# Patient Record
Sex: FEMALE | Race: WHITE | NOT HISPANIC OR LATINO
[De-identification: names, ages, dates, MRNs, and addresses within clinical notes are randomized per-mention and may not be internally consistent; named-entity substitution may affect disease eponyms.]

---

## 2020-10-30 ENCOUNTER — APPOINTMENT (OUTPATIENT)
Dept: COLORECTAL SURGERY | Facility: CLINIC | Age: 65
End: 2020-10-30
Payer: COMMERCIAL

## 2020-10-30 VITALS
BODY MASS INDEX: 23.19 KG/M2 | RESPIRATION RATE: 14 BRPM | HEIGHT: 62 IN | SYSTOLIC BLOOD PRESSURE: 126 MMHG | HEART RATE: 75 BPM | TEMPERATURE: 98 F | WEIGHT: 126 LBS | DIASTOLIC BLOOD PRESSURE: 75 MMHG

## 2020-10-30 DIAGNOSIS — Z87.39 PERSONAL HISTORY OF OTHER DISEASES OF THE MUSCULOSKELETAL SYSTEM AND CONNECTIVE TISSUE: ICD-10-CM

## 2020-10-30 DIAGNOSIS — Z86.79 PERSONAL HISTORY OF OTHER DISEASES OF THE CIRCULATORY SYSTEM: ICD-10-CM

## 2020-10-30 DIAGNOSIS — M34.1 CR(E)ST SYNDROME: ICD-10-CM

## 2020-10-30 PROCEDURE — 46600 DIAGNOSTIC ANOSCOPY SPX: CPT

## 2020-10-30 PROCEDURE — 99072 ADDL SUPL MATRL&STAF TM PHE: CPT

## 2020-10-30 PROCEDURE — 99243 OFF/OP CNSLTJ NEW/EST LOW 30: CPT | Mod: 25

## 2020-10-30 RX ORDER — HYDROXYCHLOROQUINE SULFATE 200 MG/1
TABLET ORAL
Refills: 0 | Status: ACTIVE | COMMUNITY

## 2020-10-30 RX ORDER — OMEPRAZOLE 20 MG/1
TABLET, DELAYED RELEASE ORAL
Refills: 0 | Status: ACTIVE | COMMUNITY

## 2020-10-30 NOTE — PHYSICAL EXAM
[Normal rectal exam] : exam was normal [Reduce Spontaneously] : a spontaneously reducible (grade II) [Lax] : was lax [None] : there was no rectal mass  [No Rash or Lesion] : No rash or lesion [Alert] : alert [Oriented to Person] : oriented to person [Oriented to Place] : oriented to place [Oriented to Time] : oriented to time [Calm] : calm [Skin Tags] : there were no residual hemorrhoidal skin tags seen [Gross Blood] : no gross blood [de-identified] : No apparent distress [de-identified] : Normocephalic atraumatic [de-identified] : Moving all extremities x4

## 2020-10-30 NOTE — HISTORY OF PRESENT ILLNESS
[FreeTextEntry1] : Ms. Vasquez presents to the office for consultation regarding a chronic history of fecal incontinence.  She reports a history of loose stools with daily passage of stools at least 3-4 times a day.  To help manage this, she uses Imodium regularly.  She notes that the Imodium will help bind her stool, but despite this, she will still pass small particles of formed stool per rectum without realizing.  She has tried to bulk up her stool with Metamucil, but this has been ineffective as she winds up passing stools more frequently and also has excessive bloating and gas with usage.  She is unable to also maintain gas continence.  She states that fecal incontinent episodes occur at least 4-5 times a week.  She denies any anorectal surgery, trauma from vaginal delivery, radiation to the rectum and denies any diabetic neuropathy.  She reports being up-to-date with her colonoscopy with the most recent one having been completed at least within the last 5 years.

## 2020-10-30 NOTE — ASSESSMENT
[FreeTextEntry1] : Ms. Vasquez presents to the office with reports of chronic fecal incontinence.  In office today, digital rectal exam confirmed a somewhat weak anal sphincter despite voluntary squeeze.  As she denies any trauma from surgery or delivery to the area, I do not suspect this muscle weakness is secondary to an anatomical sphincter defect.  In addition, she denies diabetic neuropathy and denies radiation to the area which could lead also to functional issues with continence.  Likely, she has age-related weakness of her external sphincter muscle.  Towards this end, I have recommended conservative management first with biofeedback therapy and dedicated pelvic floor physical therapy.  Should this fail to improve her condition, we can further investigate with anal manometry before consideration for sacral nerve stimulator.  I addressed the option of SNS, and she is interested, though agrees to proceed with conservative management first.  Follow-up in 6 weeks.

## 2020-10-30 NOTE — CONSULT LETTER
[Dear  ___] : Dear  [unfilled], [Consult Letter:] : I had the pleasure of evaluating your patient, [unfilled]. [Please see my note below.] : Please see my note below. [Consult Closing:] : Thank you very much for allowing me to participate in the care of this patient.  If you have any questions, please do not hesitate to contact me. [Sincerely,] : Sincerely, [FreeTextEntry3] : Chasity Nugent MD\par  [DrRiky  ___] : Dr. HEWITT

## 2020-12-23 ENCOUNTER — APPOINTMENT (OUTPATIENT)
Dept: COLORECTAL SURGERY | Facility: CLINIC | Age: 65
End: 2020-12-23
Payer: COMMERCIAL

## 2020-12-23 VITALS
HEIGHT: 62 IN | TEMPERATURE: 97.6 F | SYSTOLIC BLOOD PRESSURE: 122 MMHG | WEIGHT: 125 LBS | DIASTOLIC BLOOD PRESSURE: 78 MMHG | RESPIRATION RATE: 14 BRPM | HEART RATE: 75 BPM | BODY MASS INDEX: 23 KG/M2

## 2020-12-23 PROCEDURE — 99072 ADDL SUPL MATRL&STAF TM PHE: CPT

## 2020-12-23 PROCEDURE — 99214 OFFICE O/P EST MOD 30 MIN: CPT

## 2020-12-23 NOTE — PHYSICAL EXAM
[Normal rectal exam] : exam was normal [Reduce Spontaneously] : a spontaneously reducible (grade II) [Skin Tags] : there were no residual hemorrhoidal skin tags seen [Lax] : was lax [None] : there was no rectal mass  [Gross Blood] : no gross blood [No Rash or Lesion] : No rash or lesion [Alert] : alert [Oriented to Person] : oriented to person [Oriented to Place] : oriented to place [Oriented to Time] : oriented to time [Calm] : calm [de-identified] : No apparent distress [de-identified] : Normocephalic atraumatic [de-identified] : Moving all extremities x4

## 2020-12-23 NOTE — ASSESSMENT
[FreeTextEntry1] : Ms. Vasquez presents to the office with reports of chronic fecal incontinence.  In office today, digital rectal exam confirmed a somewhat weak anal sphincter despite voluntary squeeze.  As she denies any trauma from surgery or delivery to the area, I do not suspect this muscle weakness is secondary to an anatomical sphincter defect.  In addition, she denies diabetic neuropathy and denies radiation to the area which could lead also to functional issues with continence.  Likely, she has age-related weakness of her external sphincter muscle.  Towards this end, I have recommended conservative management first with biofeedback therapy and dedicated pelvic floor physical therapy.  Should this fail to improve her condition, we can further investigate with anal manometry before consideration for sacral nerve stimulator.  I addressed the option of SNS, and she is interested, though agrees to proceed with conservative management first.  Follow-up in 6 weeks.\par \par 12/23/20 Ms. Vasquez presemts to the office for follow-up of her issues with fecal incontinence.  Pelvic floor physical therapy apparently has been unproductive and ineffective.  On review of PT reports, patient is not entirely agreeable with all the necessary treatments that were offered, thereby limiting effectiveness of therapy.  There is also some clinical suspicion of rectal prolapse versus internal intussusception.  Patient herself denies any rectal prolapse, but admits to pelvic pressure during attempts at defecation.  In order to further work-up her pelvic floor issues, we will proceed with MRI defecography.  The methods with which this study is conducted was reviewed and although she is reluctant, she understands that it is necessary to help formulate a plan of care.  We will notify her of next steps once results are available.

## 2020-12-23 NOTE — HISTORY OF PRESENT ILLNESS
[FreeTextEntry1] : Ms. Vasquez presents to the office for consultation regarding a chronic history of fecal incontinence.  She reports a history of loose stools with daily passage of stools at least 3-4 times a day.  To help manage this, she uses Imodium regularly.  She notes that the Imodium will help bind her stool, but despite this, she will still pass small particles of formed stool per rectum without realizing.  She has tried to bulk up her stool with Metamucil, but this has been ineffective as she winds up passing stools more frequently and also has excessive bloating and gas with usage.  She is unable to also maintain gas continence.  She states that fecal incontinent episodes occur at least 4-5 times a week.  She denies any anorectal surgery, trauma from vaginal delivery, radiation to the rectum and denies any diabetic neuropathy.  She reports being up-to-date with her colonoscopy with the most recent one having been completed at least within the last 5 years.\par \par 12/23/20 Ms. Vasquez presents to the office for followup. Since her last office visit, she has been seen and evaluated by pelvic floor PT twice with minimal to no improvement. Per pt, on discussion with PT, there is some concern that she has a component of rectal prolapse or internal intussusception that is contributing to her issues.

## 2021-09-29 ENCOUNTER — APPOINTMENT (OUTPATIENT)
Age: 66
End: 2021-09-29
Payer: COMMERCIAL

## 2021-09-29 VITALS
HEIGHT: 62 IN | WEIGHT: 127 LBS | DIASTOLIC BLOOD PRESSURE: 70 MMHG | BODY MASS INDEX: 23.37 KG/M2 | SYSTOLIC BLOOD PRESSURE: 130 MMHG

## 2021-09-29 DIAGNOSIS — Z78.9 OTHER SPECIFIED HEALTH STATUS: ICD-10-CM

## 2021-09-29 DIAGNOSIS — Z63.5 DISRUPTION OF FAMILY BY SEPARATION AND DIVORCE: ICD-10-CM

## 2021-09-29 DIAGNOSIS — Z83.3 FAMILY HISTORY OF DIABETES MELLITUS: ICD-10-CM

## 2021-09-29 DIAGNOSIS — Z87.891 PERSONAL HISTORY OF NICOTINE DEPENDENCE: ICD-10-CM

## 2021-09-29 DIAGNOSIS — Z80.3 FAMILY HISTORY OF MALIGNANT NEOPLASM OF BREAST: ICD-10-CM

## 2021-09-29 DIAGNOSIS — M62.89 OTHER SPECIFIED DISORDERS OF MUSCLE: ICD-10-CM

## 2021-09-29 DIAGNOSIS — Z87.19 PERSONAL HISTORY OF OTHER DISEASES OF THE DIGESTIVE SYSTEM: ICD-10-CM

## 2021-09-29 PROCEDURE — 81003 URINALYSIS AUTO W/O SCOPE: CPT | Mod: QW

## 2021-09-29 PROCEDURE — 51701 INSERT BLADDER CATHETER: CPT

## 2021-09-29 PROCEDURE — 99205 OFFICE O/P NEW HI 60 MIN: CPT | Mod: 25

## 2021-09-29 RX ORDER — ESTRADIOL 0.1 MG/D
PATCH, EXTENDED RELEASE TRANSDERMAL
Refills: 0 | Status: ACTIVE | COMMUNITY

## 2021-09-29 RX ORDER — TOFACITINIB 10 MG/1
TABLET, FILM COATED ORAL
Refills: 0 | Status: ACTIVE | COMMUNITY

## 2021-09-29 RX ORDER — METHOTREXATE 2.5 MG/1
2.5 TABLET ORAL
Refills: 0 | Status: ACTIVE | COMMUNITY
Start: 2021-09-29

## 2021-09-29 RX ORDER — LIFITEGRAST 50 MG/ML
SOLUTION/ DROPS OPHTHALMIC
Refills: 0 | Status: ACTIVE | COMMUNITY

## 2021-09-29 RX ORDER — CHROMIUM 200 MCG
TABLET ORAL
Refills: 0 | Status: ACTIVE | COMMUNITY

## 2021-09-29 SDOH — SOCIAL STABILITY - SOCIAL INSECURITY: DISRUPTION OF FAMILY BY SEPARATION AND DIVORCE: Z63.5

## 2021-09-29 NOTE — OB HISTORY
[Vaginal ___] : [unfilled] vaginal delivery(s) [Definite ___ (Date)] : the last menstrual period was [unfilled] [Last Pap Smear ___] : date of last pap smear was on [unfilled] [Abnormal Pap Smear] : abnormal pap smear [Sexually Active] : is not sexually active [FreeTextEntry1] : h/o HPV

## 2021-09-29 NOTE — PHYSICAL EXAM
[Chaperone Present] : A chaperone was present in the examining room during all aspects of the physical examination [No Acute Distress] : in no acute distress [Well developed] : well developed [Well Nourished] : ~L well nourished [Oriented x3] : oriented to person, place, and time [Normal Memory] : ~T memory was ~L unimpaired [Normal Mood/Affect] : mood and affect are normal [No Edema] : ~T edema was not present [None] : no CVA tenderness [Warm and Dry] : was warm and dry to touch [Normal Gait] : gait was normal [Vulvar Atrophy] : vulvar atrophy [Labia Majora] : were normal [Labia Minora] : were normal [Atrophy] : atrophy [No Bleeding] : there was no active vaginal bleeding [2] : 2 [Aa ____] : Aa [unfilled] [Ba ____] : Ba [unfilled] [C ____] : C [unfilled] [GH ____] : GH [unfilled] [PB ____] : PB [unfilled] [TVL ____] : TVL  [unfilled] [Ap ____] : Ap [unfilled] [Bp ____] : Bp [unfilled] [D ____] : D [unfilled] [Absent] : absent [Normal] : no abnormalities [Post Void Residual ____ml] : post void residual was [unfilled] ml [Normal rectal exam] : was normal [Cough] : no cough [Tenderness] : ~T no ~M abdominal tenderness observed [Distended] : not distended [Inguinal LAD] : no adenopathy was noted in the inguinal lymph nodes [FreeTextEntry3] : neg CST

## 2021-09-29 NOTE — DISCUSSION/SUMMARY
[FreeTextEntry1] : \marielle Coronel presents for eval of vault prolapse with significant defecatory complaints. Exam with rectocele and apical prolapse, normal PVR, negative CST. Visual illustrations were used to demonstrate prolapse. We reviewed management options for her prolapse including: observation, pelvic floor exercises with and without physical therapy, pessary, and surgical management. We reviewed the different types of surgical procedures including abdominal, vaginal, and robotic/laparoscopic procedures. Mesh and non-mesh options were reviewed. IUGA information on prolapse was given to her. She is considering surgery with robotic reconstruction. I discussed MRI def due to concern for rectal prolapse. Discussed UDS/cysto and then will return for discussion. Alll questions were answered. \par

## 2021-09-29 NOTE — PROCEDURE
[Straight Catheterization] : insertion of a straight catheter [Urinary Frequency] : urinary frequency [Patient] : the patient [___ Fr Straight Tip] : a [unfilled] in [place] straight tip catheter [None] : there were no complications with the catheter insertion [No Complications] : no complications [Tolerated Well] : the patient tolerated the procedure well [Post procedure instructions and information given] : Post procedure instructions and information were given and reviewed with patient. [0] : 0

## 2021-09-29 NOTE — HISTORY OF PRESENT ILLNESS
[Cystocele (Obstetric)] : no [Uterine Prolapse] : no [Vaginal Wall Prolapse] : no [Rectal Prolapse] : no [Unable To Restrain Bowel Movement] : mild [x1] : nocturia once nightly [Urinary Frequency] : no [Feelings Of Urinary Urgency] : no [Pain During Urination (Dysuria)] : no [Urinary Tract Infection] : moderate [Constipation Obstructed Defecation] : no [Stool Visible Blood] : moderate [Pelvic Pain] : no [Vaginal Pain] : no [] : no [FreeTextEntry1] : \par 66 y/o presents with posthysterectomy prolapse, she reports that the bulge is bothersome, and when she sneezes she feels like it is a hernia and a bulge, she feels that for 4-5 years she has felt pressure when she stands, she has seen CRS for FI, with some suspicion for rectal prolapse, reports JIL  sometimes has UUI,reports frequency/urgency, dneies dysuria, denies hematuria, reports intermittent stream, occasional UTIs, reports occasional groin pain, no bulge from that area, no history of hernia \par \par in 2005 had a vaginal  hysterectomy for abnormal pap and now feels prolapse vaginally

## 2021-09-30 LAB
APPEARANCE: CLEAR
BACTERIA: NEGATIVE
BILIRUBIN URINE: NEGATIVE
BLOOD URINE: NORMAL
COLOR: COLORLESS
GLUCOSE QUALITATIVE U: NEGATIVE
HYALINE CASTS: 0 /LPF
KETONES URINE: NEGATIVE
LEUKOCYTE ESTERASE URINE: NEGATIVE
MICROSCOPIC-UA: NORMAL
NITRITE URINE: NEGATIVE
PH URINE: 6
PROTEIN URINE: NEGATIVE
RED BLOOD CELLS URINE: 0 /HPF
SPECIFIC GRAVITY URINE: 1.01
SQUAMOUS EPITHELIAL CELLS: 0 /HPF
UROBILINOGEN URINE: NORMAL
WHITE BLOOD CELLS URINE: 0 /HPF

## 2021-10-01 LAB — BACTERIA UR CULT: NORMAL

## 2021-10-28 ENCOUNTER — APPOINTMENT (OUTPATIENT)
Age: 66
End: 2021-10-28
Payer: COMMERCIAL

## 2021-10-28 PROCEDURE — 51784 ANAL/URINARY MUSCLE STUDY: CPT

## 2021-10-28 PROCEDURE — 51729 CYSTOMETROGRAM W/VP&UP: CPT

## 2021-10-28 PROCEDURE — 51797 INTRAABDOMINAL PRESSURE TEST: CPT

## 2021-10-28 PROCEDURE — 51741 ELECTRO-UROFLOWMETRY FIRST: CPT

## 2021-11-01 ENCOUNTER — NON-APPOINTMENT (OUTPATIENT)
Age: 66
End: 2021-11-01

## 2021-11-02 ENCOUNTER — APPOINTMENT (OUTPATIENT)
Age: 66
End: 2021-11-02
Payer: COMMERCIAL

## 2021-11-02 DIAGNOSIS — R39.13 SPLITTING OF URINARY STREAM: ICD-10-CM

## 2021-11-02 DIAGNOSIS — R10.2 PELVIC AND PERINEAL PAIN: ICD-10-CM

## 2021-11-02 DIAGNOSIS — N81.9 FEMALE GENITAL PROLAPSE, UNSPECIFIED: ICD-10-CM

## 2021-11-02 LAB
BILIRUB UR QL STRIP: NEGATIVE
CLARITY UR: CLEAR
COLLECTION METHOD: NORMAL
GLUCOSE UR-MCNC: NEGATIVE
HCG UR QL: 0.2 EU/DL
HGB UR QL STRIP.AUTO: NORMAL
KETONES UR-MCNC: NEGATIVE
LEUKOCYTE ESTERASE UR QL STRIP: NEGATIVE
NITRITE UR QL STRIP: NEGATIVE
PH UR STRIP: 6
PROT UR STRIP-MCNC: NEGATIVE
SP GR UR STRIP: 1

## 2021-11-02 PROCEDURE — 99213 OFFICE O/P EST LOW 20 MIN: CPT | Mod: 25

## 2021-11-02 PROCEDURE — 52000 CYSTOURETHROSCOPY: CPT

## 2021-11-02 NOTE — HISTORY OF PRESENT ILLNESS
[Cystocele (Obstetric)] : no [Uterine Prolapse] : no [Vaginal Wall Prolapse] : no [Rectal Prolapse] : no [Unable To Restrain Bowel Movement] : mild [x1] : nocturia once nightly [Urinary Frequency] : no [Feelings Of Urinary Urgency] : no [Pain During Urination (Dysuria)] : no [Urinary Tract Infection] : moderate [Constipation Obstructed Defecation] : no [] : no [Stool Visible Blood] : moderate [Pelvic Pain] : no [Vaginal Pain] : no [FreeTextEntry1] : \par Berna presents for f/u, she has POP and large rectocele, she reports the bulge is very bothersome, she has not done MRI def, UDS negative for JIL, cystoscopy negative\par she is considering surgical management of post hysterectomy POP

## 2021-11-02 NOTE — PHYSICAL EXAM
[Chaperone Present] : A chaperone was present in the examining room during all aspects of the physical examination [No Acute Distress] : in no acute distress [Well developed] : well developed [Well Nourished] : ~L well nourished [Oriented x3] : oriented to person, place, and time [Normal Memory] : ~T memory was ~L unimpaired [Normal Mood/Affect] : mood and affect are normal [Cough] : no cough [No Edema] : ~T edema was not present [Inguinal LAD] : no adenopathy was noted in the inguinal lymph nodes [Warm and Dry] : was warm and dry to touch [Normal Gait] : gait was normal [Vulvar Atrophy] : vulvar atrophy [Labia Majora] : were normal [Labia Minora] : were normal [Atrophy] : atrophy [No Bleeding] : there was no active vaginal bleeding [2] : 2 [Aa ____] : Aa [unfilled] [Ba ____] : Ba [unfilled] [C ____] : C [unfilled] [GH ____] : GH [unfilled] [PB ____] : PB [unfilled] [TVL ____] : TVL  [unfilled] [Ap ____] : Ap [unfilled] [Bp ____] : Bp [unfilled] [D ____] : D [unfilled] [Absent] : absent [Normal] : no abnormalities

## 2021-11-02 NOTE — DISCUSSION/SUMMARY
[FreeTextEntry1] : \marielle Coronel presents for eval of vault prolapse with significant defecatory complaints. Cystoscopy negative, UDS with no JIL. Plan for MRI def and surgical planning with likely robotic SCP. All questions were answered.

## 2021-11-03 ENCOUNTER — RESULT REVIEW (OUTPATIENT)
Age: 66
End: 2021-11-03

## 2021-11-18 ENCOUNTER — OUTPATIENT (OUTPATIENT)
Dept: OUTPATIENT SERVICES | Facility: HOSPITAL | Age: 66
LOS: 1 days | End: 2021-11-18
Payer: COMMERCIAL

## 2021-11-18 ENCOUNTER — APPOINTMENT (OUTPATIENT)
Dept: MRI IMAGING | Facility: CLINIC | Age: 66
End: 2021-11-18
Payer: COMMERCIAL

## 2021-11-18 DIAGNOSIS — M62.89 OTHER SPECIFIED DISORDERS OF MUSCLE: ICD-10-CM

## 2021-11-18 PROCEDURE — 72195 MRI PELVIS W/O DYE: CPT | Mod: 26

## 2021-11-18 PROCEDURE — 72195 MRI PELVIS W/O DYE: CPT

## 2021-12-01 ENCOUNTER — TRANSCRIPTION ENCOUNTER (OUTPATIENT)
Age: 66
End: 2021-12-01

## 2022-01-02 ENCOUNTER — TRANSCRIPTION ENCOUNTER (OUTPATIENT)
Age: 67
End: 2022-01-02

## 2022-01-14 ENCOUNTER — APPOINTMENT (OUTPATIENT)
Dept: UROGYNECOLOGY | Facility: CLINIC | Age: 67
End: 2022-01-14
Payer: COMMERCIAL

## 2022-01-14 DIAGNOSIS — N39.3 STRESS INCONTINENCE (FEMALE) (MALE): ICD-10-CM

## 2022-01-14 PROCEDURE — 99214 OFFICE O/P EST MOD 30 MIN: CPT | Mod: 25

## 2022-01-14 PROCEDURE — 51725 SIMPLE CYSTOMETROGRAM: CPT

## 2022-01-14 NOTE — HISTORY OF PRESENT ILLNESS
[Cystocele (Obstetric)] : no [Uterine Prolapse] : no [Vaginal Wall Prolapse] : no [Rectal Prolapse] : no [Unable To Restrain Bowel Movement] : mild [x1] : nocturia once nightly [Urinary Frequency] : no [Feelings Of Urinary Urgency] : no [Pain During Urination (Dysuria)] : no [Urinary Tract Infection] : moderate [Constipation Obstructed Defecation] : no [Stool Visible Blood] : moderate [Pelvic Pain] : no [Vaginal Pain] : no [] : no [FreeTextEntry1] : \par Berna presents for f/u on rectocele JIL\par cystoscopy negative\par UDS negative but pt with JIL complaints\par MRI with no rectal intussusception/prolapse \par \par will stop patch 1 month before surgery

## 2022-01-14 NOTE — PROCEDURE
[First Sensation: _____ ml] : first sensation at [unfilled] ml [Fullness: ____ ml] : fullness at [unfilled] ml [FCI ____ml] : FCI [unfilled] ml [Stress Urinary Incontinence] : stress urinary incontinence was observed

## 2022-01-14 NOTE — PHYSICAL EXAM
[Chaperone Present] : A chaperone was present in the examining room during all aspects of the physical examination [No Acute Distress] : in no acute distress [Well developed] : well developed [Well Nourished] : ~L well nourished [Oriented x3] : oriented to person, place, and time [Normal Memory] : ~T memory was ~L unimpaired [Normal Mood/Affect] : mood and affect are normal [No Edema] : ~T edema was not present [Warm and Dry] : was warm and dry to touch [Normal Gait] : gait was normal [Vulvar Atrophy] : vulvar atrophy [Labia Majora] : were normal [Labia Minora] : were normal [Atrophy] : atrophy [No Bleeding] : there was no active vaginal bleeding [2] : 2 [Aa ____] : Aa [unfilled] [Ba ____] : Ba [unfilled] [C ____] : C [unfilled] [GH ____] : GH [unfilled] [PB ____] : PB [unfilled] [TVL ____] : TVL  [unfilled] [Ap ____] : Ap [unfilled] [Bp ____] : Bp [unfilled] [D ____] : D [unfilled] [Absent] : absent [Normal] : no abnormalities [Cough] : no cough [Inguinal LAD] : no adenopathy was noted in the inguinal lymph nodes [de-identified] : reduction of POP noted with apical reduction with large cotton swab

## 2022-01-14 NOTE — DISCUSSION/SUMMARY
[FreeTextEntry1] : Berna presents with post hysterectomy prolapse and JIL.\par \par UDS negative but CMG positive today. \par MRI with no rectal prolapse or intussusception. \par \par The patient presented to the office today for counseling regarding her decision for possible pelvic reconstructive surgery. All pertinent prior studies including urodynamic testing were reviewed. 						\par The patient was counseled regarding alternative non-surgical therapies as well as the prognosis with no intervention.\par \par The patient was advised regarding various surgical options including abdominal, robotic/laparoscopic and vaginal approaches. The risks and benefits of surgery using endogenous tissue only versus the use of graft insertion (mesh) were fully reviewed.  She was informed of the potential for improved durability and the inherent risk of graft use including but not limited to infection, erosion and chronic inflammation, acute and chronic pain, pain with intercourse (both of which may be refractory to treatment) fistula, cervical elongation, disturbance in bowel or bladder function, any of which may require additional surgery for mesh revision.  She is aware that the mesh used in her surgery is a permanent mesh.  The patient was advised regarding the July 2011 FDA notification regarding these issues and provided the website address for further reference www.fda.gov.  \par \par The general risks of the surgery were reviewed including, but not limited to infection, bleeding, including transfusion, surrounding organ or tissue injury (bladder, rectum, bowel, urethra, ureters, nerves vessels or muscles), failure meaning recurrent prolapse and/or continued leaking, voiding dysfunction, needing to go home with a catheter, pain with sex, blood clots, and anesthesia.\par \par The approximate length of the surgery, hospital stay and postoperative recovery period were reviewed, including a general overview of convalescence and postoperative follow-up.\par \par The patient is aware that learner’s (medical students/residents/fellows) may be participating in a pelvic exam under anesthesia.\par \par The patient verbalized a desire to proceed with the surgery.  Appropriate informed consent was obtained.  All questions were answered to the patient’s satisfaction.\par \par IUGA SCP/MUS given to patient. All questions were answered.\par \par [] consent day of: pelvic exam  under anesthesia, robotic SCP, MUS, cysto possible laparotomy, possible anterior/posterior repair, possible salpingectomy if tubes are present, any other indicated procedures \par \par salpginectomy if tubes present \par \par

## 2022-02-04 ENCOUNTER — OUTPATIENT (OUTPATIENT)
Dept: OUTPATIENT SERVICES | Facility: HOSPITAL | Age: 67
LOS: 1 days | End: 2022-02-04
Payer: COMMERCIAL

## 2022-02-04 VITALS
WEIGHT: 123.9 LBS | HEART RATE: 68 BPM | RESPIRATION RATE: 16 BRPM | SYSTOLIC BLOOD PRESSURE: 128 MMHG | TEMPERATURE: 98 F | OXYGEN SATURATION: 100 % | HEIGHT: 62 IN | DIASTOLIC BLOOD PRESSURE: 64 MMHG

## 2022-02-04 DIAGNOSIS — N99.3 PROLAPSE OF VAGINAL VAULT AFTER HYSTERECTOMY: ICD-10-CM

## 2022-02-04 DIAGNOSIS — Z01.818 ENCOUNTER FOR OTHER PREPROCEDURAL EXAMINATION: ICD-10-CM

## 2022-02-04 DIAGNOSIS — Z98.1 ARTHRODESIS STATUS: Chronic | ICD-10-CM

## 2022-02-04 DIAGNOSIS — Z90.710 ACQUIRED ABSENCE OF BOTH CERVIX AND UTERUS: Chronic | ICD-10-CM

## 2022-02-04 LAB
ANION GAP SERPL CALC-SCNC: 3 MMOL/L — LOW (ref 5–17)
APPEARANCE UR: CLEAR — SIGNIFICANT CHANGE UP
BASOPHILS # BLD AUTO: 0.05 K/UL — SIGNIFICANT CHANGE UP (ref 0–0.2)
BASOPHILS NFR BLD AUTO: 1 % — SIGNIFICANT CHANGE UP (ref 0–2)
BILIRUB UR-MCNC: NEGATIVE — SIGNIFICANT CHANGE UP
BUN SERPL-MCNC: 18 MG/DL — SIGNIFICANT CHANGE UP (ref 7–23)
CALCIUM SERPL-MCNC: 8.9 MG/DL — SIGNIFICANT CHANGE UP (ref 8.5–10.1)
CHLORIDE SERPL-SCNC: 110 MMOL/L — HIGH (ref 96–108)
CO2 SERPL-SCNC: 29 MMOL/L — SIGNIFICANT CHANGE UP (ref 22–31)
COLOR SPEC: YELLOW — SIGNIFICANT CHANGE UP
CREAT SERPL-MCNC: 1.04 MG/DL — SIGNIFICANT CHANGE UP (ref 0.5–1.3)
DIFF PNL FLD: ABNORMAL
EOSINOPHIL # BLD AUTO: 0.08 K/UL — SIGNIFICANT CHANGE UP (ref 0–0.5)
EOSINOPHIL NFR BLD AUTO: 1.7 % — SIGNIFICANT CHANGE UP (ref 0–6)
GLUCOSE SERPL-MCNC: 99 MG/DL — SIGNIFICANT CHANGE UP (ref 70–99)
GLUCOSE UR QL: NEGATIVE — SIGNIFICANT CHANGE UP
HCT VFR BLD CALC: 39.8 % — SIGNIFICANT CHANGE UP (ref 34.5–45)
HGB BLD-MCNC: 12.6 G/DL — SIGNIFICANT CHANGE UP (ref 11.5–15.5)
IMM GRANULOCYTES NFR BLD AUTO: 0.2 % — SIGNIFICANT CHANGE UP (ref 0–1.5)
KETONES UR-MCNC: NEGATIVE — SIGNIFICANT CHANGE UP
LEUKOCYTE ESTERASE UR-ACNC: NEGATIVE — SIGNIFICANT CHANGE UP
LYMPHOCYTES # BLD AUTO: 1.55 K/UL — SIGNIFICANT CHANGE UP (ref 1–3.3)
LYMPHOCYTES # BLD AUTO: 32.5 % — SIGNIFICANT CHANGE UP (ref 13–44)
MCHC RBC-ENTMCNC: 31.5 PG — SIGNIFICANT CHANGE UP (ref 27–34)
MCHC RBC-ENTMCNC: 31.7 GM/DL — LOW (ref 32–36)
MCV RBC AUTO: 99.5 FL — SIGNIFICANT CHANGE UP (ref 80–100)
MONOCYTES # BLD AUTO: 0.42 K/UL — SIGNIFICANT CHANGE UP (ref 0–0.9)
MONOCYTES NFR BLD AUTO: 8.8 % — SIGNIFICANT CHANGE UP (ref 2–14)
NEUTROPHILS # BLD AUTO: 2.66 K/UL — SIGNIFICANT CHANGE UP (ref 1.8–7.4)
NEUTROPHILS NFR BLD AUTO: 55.8 % — SIGNIFICANT CHANGE UP (ref 43–77)
NITRITE UR-MCNC: NEGATIVE — SIGNIFICANT CHANGE UP
PH UR: 6 — SIGNIFICANT CHANGE UP (ref 5–8)
PLATELET # BLD AUTO: 189 K/UL — SIGNIFICANT CHANGE UP (ref 150–400)
POTASSIUM SERPL-MCNC: 4.3 MMOL/L — SIGNIFICANT CHANGE UP (ref 3.5–5.3)
POTASSIUM SERPL-SCNC: 4.3 MMOL/L — SIGNIFICANT CHANGE UP (ref 3.5–5.3)
PROT UR-MCNC: NEGATIVE — SIGNIFICANT CHANGE UP
RBC # BLD: 4 M/UL — SIGNIFICANT CHANGE UP (ref 3.8–5.2)
RBC # FLD: 13.9 % — SIGNIFICANT CHANGE UP (ref 10.3–14.5)
SODIUM SERPL-SCNC: 142 MMOL/L — SIGNIFICANT CHANGE UP (ref 135–145)
SP GR SPEC: 1.01 — SIGNIFICANT CHANGE UP (ref 1.01–1.02)
UROBILINOGEN FLD QL: NEGATIVE — SIGNIFICANT CHANGE UP
WBC # BLD: 4.77 K/UL — SIGNIFICANT CHANGE UP (ref 3.8–10.5)
WBC # FLD AUTO: 4.77 K/UL — SIGNIFICANT CHANGE UP (ref 3.8–10.5)

## 2022-02-04 PROCEDURE — 93005 ELECTROCARDIOGRAM TRACING: CPT

## 2022-02-04 PROCEDURE — 86900 BLOOD TYPING SEROLOGIC ABO: CPT

## 2022-02-04 PROCEDURE — 80048 BASIC METABOLIC PNL TOTAL CA: CPT

## 2022-02-04 PROCEDURE — 86901 BLOOD TYPING SEROLOGIC RH(D): CPT

## 2022-02-04 PROCEDURE — 83036 HEMOGLOBIN GLYCOSYLATED A1C: CPT

## 2022-02-04 PROCEDURE — 87640 STAPH A DNA AMP PROBE: CPT

## 2022-02-04 PROCEDURE — 93010 ELECTROCARDIOGRAM REPORT: CPT

## 2022-02-04 PROCEDURE — 81001 URINALYSIS AUTO W/SCOPE: CPT

## 2022-02-04 PROCEDURE — 87641 MR-STAPH DNA AMP PROBE: CPT

## 2022-02-04 PROCEDURE — G0463: CPT | Mod: 25

## 2022-02-04 PROCEDURE — 86850 RBC ANTIBODY SCREEN: CPT

## 2022-02-04 PROCEDURE — 85025 COMPLETE CBC W/AUTO DIFF WBC: CPT

## 2022-02-04 PROCEDURE — 36415 COLL VENOUS BLD VENIPUNCTURE: CPT

## 2022-02-04 PROCEDURE — 87086 URINE CULTURE/COLONY COUNT: CPT

## 2022-02-04 NOTE — H&P PST ADULT - NSICDXFAMILYHX_GEN_ALL_CORE_FT
FAMILY HISTORY:  Father  Still living? Unknown  FH: diabetes mellitus, Age at diagnosis: Age Unknown  FH: heart disease, Age at diagnosis: Age Unknown    Mother  Still living? Unknown  FH: bladder cancer, Age at diagnosis: Age Unknown  FH: breast cancer, Age at diagnosis: Age Unknown

## 2022-02-04 NOTE — H&P PST ADULT - ATTENDING COMMENTS
pt seen  discussed robotic SCP/Sling, cysto, possible BS, possible laparotomy   reviewed holding off on estrogen patch until at least 2 wks postp

## 2022-02-04 NOTE — H&P PST ADULT - ASSESSMENT
66 year old female diagnosed with vaginal vault prolapse and stress incontinence; pt said her activities ( standing and bending)  are restricted due to prolapse; she presents to PST for planned robotic sacrocolpopexy and urethral sling cystoscopy    Plan:  1. PST instructions given ; NPO status instructions to be given by ASU   2. Pt instructed to take following meds with sip of water : xeljanz omeprazole   3. Pt instructed to take routine evening medications unless indicated   4. Stop NSAIDS ( Aspirin Alev Motrin Mobic Diclofenac), herbal supplements , MVI , Vitamin fish oil 7 days prior to surgery  unless   directed by surgeon or cardiologist;   5. Medical Optimization  with Dr Huang   6. EZ wash instructions given & mupirocin instructions given  7. Labs EKG as per surgeon request   8. Pt instructed to self quarantine after Covid test   9. Covid Testing scheduled Pt notified and aware  10. Pt denies covid symptoms shortness of breath fever cough   11. Instructed pt to call Rheumatology for management of RA meds

## 2022-02-04 NOTE — H&P PST ADULT - FALL HARM RISK - UNIVERSAL INTERVENTIONS
Bed in lowest position, wheels locked, appropriate side rails in place/Call bell, personal items and telephone in reach/Instruct patient to call for assistance before getting out of bed or chair/Non-slip footwear when patient is out of bed/[place] to call system/Physically safe environment - no spills, clutter or unnecessary equipment/Purposeful Proactive Rounding/Room/bathroom lighting operational, light cord in reach

## 2022-02-04 NOTE — H&P PST ADULT - HEALTH CARE MAINTENANCE
Pt denies travel out of [facility] for the past 14 days Pt denies  travel internationally for the past 21 days

## 2022-02-04 NOTE — H&P PST ADULT - NSANTHOSAYNRD_GEN_A_CORE
No. J CARLOS screening performed.  STOP BANG Legend: 0-2 = LOW Risk; 3-4 = INTERMEDIATE Risk; 5-8 = HIGH Risk

## 2022-02-04 NOTE — H&P PST ADULT - NSICDXPASTSURGICALHX_GEN_ALL_CORE_FT
PAST SURGICAL HISTORY:  H/O: hysterectomy 6/2004    History of fusion of cervical spine 6/2012     PAST SURGICAL HISTORY:  H/O: hysterectomy 6/2004 due to cervical dysplasia    History of fusion of cervical spine 6/2012

## 2022-02-04 NOTE — H&P PST ADULT - NSICDXPASTMEDICALHX_GEN_ALL_CORE_FT
PAST MEDICAL HISTORY:  Chronic headaches     Dry eyes     GERD (gastroesophageal reflux disease)     HLD (hyperlipidemia) no meds currently    Hot flashes     Mitral valve disorder moderate    Raynauds disease     Rheumatoid arthritis     Seasonal allergies     Stress incontinence     Temporomandibular joint disorder (TMJ)     Vaginal vault prolapse      PAST MEDICAL HISTORY:  Chronic headaches     Dry eyes     GERD (gastroesophageal reflux disease)     History of cervical dysplasia     HLD (hyperlipidemia) no meds currently    Hot flashes     Mitral valve disorder moderate    Raynauds disease     Rheumatoid arthritis     Seasonal allergies     Stress incontinence     Temporomandibular joint disorder (TMJ)     Vaginal vault prolapse

## 2022-02-04 NOTE — H&P PST ADULT - NEGATIVE GASTROINTESTINAL SYMPTOMS
no diarrhea/no constipation/no change in bowel habits/no hematochezia no constipation/no change in bowel habits/no hematochezia

## 2022-02-04 NOTE — H&P PST ADULT - HISTORY OF PRESENT ILLNESS
66 year old female diagnosed with vaginal vault prolapse and stress incontinence; pt said her activities ( standing and bending)  are restricted due to prolapse; she presents to PST for planned robotic sacrocolpopexy and urethral sling cystoscopy

## 2022-02-05 DIAGNOSIS — N99.3 PROLAPSE OF VAGINAL VAULT AFTER HYSTERECTOMY: ICD-10-CM

## 2022-02-05 DIAGNOSIS — Z01.818 ENCOUNTER FOR OTHER PREPROCEDURAL EXAMINATION: ICD-10-CM

## 2022-02-05 LAB
A1C WITH ESTIMATED AVERAGE GLUCOSE RESULT: 5.5 % — SIGNIFICANT CHANGE UP (ref 4–5.6)
CULTURE RESULTS: SIGNIFICANT CHANGE UP
ESTIMATED AVERAGE GLUCOSE: 111 MG/DL — SIGNIFICANT CHANGE UP (ref 68–114)
MRSA PCR RESULT.: SIGNIFICANT CHANGE UP
S AUREUS DNA NOSE QL NAA+PROBE: DETECTED
SPECIMEN SOURCE: SIGNIFICANT CHANGE UP

## 2022-02-11 RX ORDER — SODIUM CHLORIDE 9 MG/ML
3 INJECTION INTRAMUSCULAR; INTRAVENOUS; SUBCUTANEOUS EVERY 8 HOURS
Refills: 0 | Status: DISCONTINUED | OUTPATIENT
Start: 2022-02-14 | End: 2022-02-14

## 2022-02-11 RX ORDER — SODIUM CHLORIDE 9 MG/ML
1000 INJECTION, SOLUTION INTRAVENOUS
Refills: 0 | Status: DISCONTINUED | OUTPATIENT
Start: 2022-02-14 | End: 2022-02-14

## 2022-02-11 RX ORDER — ONDANSETRON 8 MG/1
4 TABLET, FILM COATED ORAL EVERY 6 HOURS
Refills: 0 | Status: DISCONTINUED | OUTPATIENT
Start: 2022-02-14 | End: 2022-02-14

## 2022-02-11 RX ORDER — FENTANYL CITRATE 50 UG/ML
50 INJECTION INTRAVENOUS
Refills: 0 | Status: DISCONTINUED | OUTPATIENT
Start: 2022-02-14 | End: 2022-02-14

## 2022-02-14 ENCOUNTER — RESULT REVIEW (OUTPATIENT)
Age: 67
End: 2022-02-14

## 2022-02-14 ENCOUNTER — APPOINTMENT (OUTPATIENT)
Dept: UROGYNECOLOGY | Facility: HOSPITAL | Age: 67
End: 2022-02-14
Payer: COMMERCIAL

## 2022-02-14 ENCOUNTER — TRANSCRIPTION ENCOUNTER (OUTPATIENT)
Age: 67
End: 2022-02-14

## 2022-02-14 ENCOUNTER — OUTPATIENT (OUTPATIENT)
Dept: INPATIENT UNIT | Facility: HOSPITAL | Age: 67
LOS: 1 days | Discharge: HOME | End: 2022-02-14
Payer: COMMERCIAL

## 2022-02-14 VITALS
TEMPERATURE: 98 F | OXYGEN SATURATION: 100 % | HEART RATE: 78 BPM | DIASTOLIC BLOOD PRESSURE: 61 MMHG | SYSTOLIC BLOOD PRESSURE: 125 MMHG | RESPIRATION RATE: 16 BRPM

## 2022-02-14 VITALS
HEIGHT: 62 IN | WEIGHT: 123.9 LBS | TEMPERATURE: 97 F | OXYGEN SATURATION: 99 % | SYSTOLIC BLOOD PRESSURE: 133 MMHG | HEART RATE: 72 BPM | DIASTOLIC BLOOD PRESSURE: 75 MMHG | RESPIRATION RATE: 16 BRPM

## 2022-02-14 DIAGNOSIS — N99.3 PROLAPSE OF VAGINAL VAULT AFTER HYSTERECTOMY: ICD-10-CM

## 2022-02-14 DIAGNOSIS — N39.3 STRESS INCONTINENCE (FEMALE) (MALE): ICD-10-CM

## 2022-02-14 DIAGNOSIS — Z90.710 ACQUIRED ABSENCE OF BOTH CERVIX AND UTERUS: Chronic | ICD-10-CM

## 2022-02-14 DIAGNOSIS — Z98.1 ARTHRODESIS STATUS: Chronic | ICD-10-CM

## 2022-02-14 LAB
ANION GAP SERPL CALC-SCNC: 7 MMOL/L — SIGNIFICANT CHANGE UP (ref 5–17)
BUN SERPL-MCNC: 18 MG/DL — SIGNIFICANT CHANGE UP (ref 7–23)
CALCIUM SERPL-MCNC: 8.6 MG/DL — SIGNIFICANT CHANGE UP (ref 8.5–10.1)
CHLORIDE SERPL-SCNC: 107 MMOL/L — SIGNIFICANT CHANGE UP (ref 96–108)
CO2 SERPL-SCNC: 27 MMOL/L — SIGNIFICANT CHANGE UP (ref 22–31)
CREAT SERPL-MCNC: 1.06 MG/DL — SIGNIFICANT CHANGE UP (ref 0.5–1.3)
GLUCOSE SERPL-MCNC: 129 MG/DL — HIGH (ref 70–99)
HCT VFR BLD CALC: 36.6 % — SIGNIFICANT CHANGE UP (ref 34.5–45)
HGB BLD-MCNC: 11.8 G/DL — SIGNIFICANT CHANGE UP (ref 11.5–15.5)
MCHC RBC-ENTMCNC: 31.6 PG — SIGNIFICANT CHANGE UP (ref 27–34)
MCHC RBC-ENTMCNC: 32.2 GM/DL — SIGNIFICANT CHANGE UP (ref 32–36)
MCV RBC AUTO: 97.9 FL — SIGNIFICANT CHANGE UP (ref 80–100)
PLATELET # BLD AUTO: 186 K/UL — SIGNIFICANT CHANGE UP (ref 150–400)
POTASSIUM SERPL-MCNC: 3.7 MMOL/L — SIGNIFICANT CHANGE UP (ref 3.5–5.3)
POTASSIUM SERPL-SCNC: 3.7 MMOL/L — SIGNIFICANT CHANGE UP (ref 3.5–5.3)
RBC # BLD: 3.74 M/UL — LOW (ref 3.8–5.2)
RBC # FLD: 13.4 % — SIGNIFICANT CHANGE UP (ref 10.3–14.5)
SODIUM SERPL-SCNC: 141 MMOL/L — SIGNIFICANT CHANGE UP (ref 135–145)
WBC # BLD: 10.11 K/UL — SIGNIFICANT CHANGE UP (ref 3.8–10.5)
WBC # FLD AUTO: 10.11 K/UL — SIGNIFICANT CHANGE UP (ref 3.8–10.5)

## 2022-02-14 PROCEDURE — 85027 COMPLETE CBC AUTOMATED: CPT

## 2022-02-14 PROCEDURE — 88302 TISSUE EXAM BY PATHOLOGIST: CPT | Mod: 26

## 2022-02-14 PROCEDURE — 88302 TISSUE EXAM BY PATHOLOGIST: CPT

## 2022-02-14 PROCEDURE — S2900: CPT

## 2022-02-14 PROCEDURE — C9399: CPT

## 2022-02-14 PROCEDURE — 36415 COLL VENOUS BLD VENIPUNCTURE: CPT

## 2022-02-14 PROCEDURE — 80048 BASIC METABOLIC PNL TOTAL CA: CPT

## 2022-02-14 PROCEDURE — C1781: CPT

## 2022-02-14 PROCEDURE — 57425 LAPAROSCOPY SURG COLPOPEXY: CPT | Mod: 80

## 2022-02-14 PROCEDURE — 57288 REPAIR BLADDER DEFECT: CPT

## 2022-02-14 PROCEDURE — C1771: CPT

## 2022-02-14 RX ORDER — ACETAMINOPHEN 500 MG
975 TABLET ORAL ONCE
Refills: 0 | Status: COMPLETED | OUTPATIENT
Start: 2022-02-14 | End: 2022-02-14

## 2022-02-14 RX ORDER — METHOTREXATE 2.5 MG/1
0 TABLET ORAL
Qty: 0 | Refills: 0 | DISCHARGE

## 2022-02-14 RX ORDER — POLYETHYLENE GLYCOL 3350 17 G/17G
17 POWDER, FOR SOLUTION ORAL AT BEDTIME
Refills: 0 | Status: DISCONTINUED | OUTPATIENT
Start: 2022-02-14 | End: 2022-02-14

## 2022-02-14 RX ORDER — TOFACITINIB 11 MG/1
0 TABLET, FILM COATED, EXTENDED RELEASE ORAL
Qty: 0 | Refills: 0 | DISCHARGE

## 2022-02-14 RX ORDER — OXYCODONE HYDROCHLORIDE 5 MG/1
5 TABLET ORAL ONCE
Refills: 0 | Status: DISCONTINUED | OUTPATIENT
Start: 2022-02-14 | End: 2022-02-14

## 2022-02-14 RX ORDER — IBUPROFEN 200 MG
1 TABLET ORAL
Qty: 16 | Refills: 0
Start: 2022-02-14 | End: 2022-02-17

## 2022-02-14 RX ORDER — ONDANSETRON 8 MG/1
8 TABLET, FILM COATED ORAL EVERY 8 HOURS
Refills: 0 | Status: DISCONTINUED | OUTPATIENT
Start: 2022-02-14 | End: 2022-02-14

## 2022-02-14 RX ORDER — OXYCODONE HYDROCHLORIDE 5 MG/1
1 TABLET ORAL
Qty: 8 | Refills: 0
Start: 2022-02-14 | End: 2022-02-15

## 2022-02-14 RX ORDER — ACETAMINOPHEN 500 MG
1000 TABLET ORAL EVERY 6 HOURS
Refills: 0 | Status: DISCONTINUED | OUTPATIENT
Start: 2022-02-14 | End: 2022-02-14

## 2022-02-14 RX ORDER — SODIUM CHLORIDE 9 MG/ML
1000 INJECTION, SOLUTION INTRAVENOUS
Refills: 0 | Status: DISCONTINUED | OUTPATIENT
Start: 2022-02-14 | End: 2022-02-14

## 2022-02-14 RX ORDER — ACETAMINOPHEN 500 MG
2 TABLET ORAL
Qty: 32 | Refills: 0
Start: 2022-02-14 | End: 2022-02-17

## 2022-02-14 RX ORDER — POVIDONE-IODINE 5 %
0 AEROSOL (ML) TOPICAL
Qty: 0 | Refills: 0 | DISCHARGE

## 2022-02-14 RX ORDER — CHOLECALCIFEROL (VITAMIN D3) 125 MCG
1 CAPSULE ORAL
Qty: 0 | Refills: 0 | DISCHARGE

## 2022-02-14 RX ORDER — FAMOTIDINE 10 MG/ML
20 INJECTION INTRAVENOUS ONCE
Refills: 0 | Status: COMPLETED | OUTPATIENT
Start: 2022-02-14 | End: 2022-02-14

## 2022-02-14 RX ORDER — HYDROXYCHLOROQUINE SULFATE 200 MG
1 TABLET ORAL
Qty: 0 | Refills: 0 | DISCHARGE

## 2022-02-14 RX ORDER — METRONIDAZOLE 500 MG
500 TABLET ORAL ONCE
Refills: 0 | Status: COMPLETED | OUTPATIENT
Start: 2022-02-14 | End: 2022-02-14

## 2022-02-14 RX ORDER — OXYCODONE HYDROCHLORIDE 5 MG/1
5 TABLET ORAL EVERY 4 HOURS
Refills: 0 | Status: DISCONTINUED | OUTPATIENT
Start: 2022-02-14 | End: 2022-02-14

## 2022-02-14 RX ORDER — FOLIC ACID 0.8 MG
1 TABLET ORAL
Qty: 0 | Refills: 0 | DISCHARGE

## 2022-02-14 RX ORDER — OMEPRAZOLE 10 MG/1
1 CAPSULE, DELAYED RELEASE ORAL
Qty: 0 | Refills: 0 | DISCHARGE

## 2022-02-14 RX ORDER — HEPARIN SODIUM 5000 [USP'U]/ML
5000 INJECTION INTRAVENOUS; SUBCUTANEOUS ONCE
Refills: 0 | Status: COMPLETED | OUTPATIENT
Start: 2022-02-14 | End: 2022-02-14

## 2022-02-14 RX ORDER — GABAPENTIN 400 MG/1
300 CAPSULE ORAL EVERY 8 HOURS
Refills: 0 | Status: DISCONTINUED | OUTPATIENT
Start: 2022-02-14 | End: 2022-02-14

## 2022-02-14 RX ORDER — KETOROLAC TROMETHAMINE 30 MG/ML
30 SYRINGE (ML) INJECTION EVERY 8 HOURS
Refills: 0 | Status: DISCONTINUED | OUTPATIENT
Start: 2022-02-14 | End: 2022-02-14

## 2022-02-14 RX ORDER — GENTAMICIN SULFATE 40 MG/ML
150 VIAL (ML) INJECTION ONCE
Refills: 0 | Status: COMPLETED | OUTPATIENT
Start: 2022-02-14 | End: 2022-02-14

## 2022-02-14 RX ORDER — SIMETHICONE 80 MG/1
80 TABLET, CHEWABLE ORAL EVERY 6 HOURS
Refills: 0 | Status: DISCONTINUED | OUTPATIENT
Start: 2022-02-14 | End: 2022-02-14

## 2022-02-14 RX ADMIN — SODIUM CHLORIDE 100 MILLILITER(S): 9 INJECTION, SOLUTION INTRAVENOUS at 12:33

## 2022-02-14 RX ADMIN — Medication 975 MILLIGRAM(S): at 07:47

## 2022-02-14 RX ADMIN — Medication 975 MILLIGRAM(S): at 07:49

## 2022-02-14 RX ADMIN — SODIUM CHLORIDE 3 MILLILITER(S): 9 INJECTION INTRAMUSCULAR; INTRAVENOUS; SUBCUTANEOUS at 14:36

## 2022-02-14 RX ADMIN — Medication 1000 MILLIGRAM(S): at 14:43

## 2022-02-14 RX ADMIN — FAMOTIDINE 20 MILLIGRAM(S): 10 INJECTION INTRAVENOUS at 07:47

## 2022-02-14 RX ADMIN — ONDANSETRON 8 MILLIGRAM(S): 8 TABLET, FILM COATED ORAL at 15:42

## 2022-02-14 RX ADMIN — HEPARIN SODIUM 5000 UNIT(S): 5000 INJECTION INTRAVENOUS; SUBCUTANEOUS at 08:00

## 2022-02-14 NOTE — ASU PATIENT PROFILE, ADULT - NSICDXPASTSURGICALHX_GEN_ALL_CORE_FT
PAST SURGICAL HISTORY:  H/O: hysterectomy 6/2004 due to cervical dysplasia    History of fusion of cervical spine 6/2012

## 2022-02-14 NOTE — ASU DISCHARGE PLAN (ADULT/PEDIATRIC) - NS MD DC FALL RISK RISK
For information on Fall & Injury Prevention, visit: [email or web address] OR  [email or web address] OR  https://www.cdc.gov/steadi/patient.html

## 2022-02-14 NOTE — ASU PREOP CHECKLIST - ALLERGY BAND ON
"Chief Complaint   Patient presents with     Consult     Here today for a bump on his head      BP 92/69 (BP Location: Right arm, Patient Position: Sitting, Cuff Size: Child)  Pulse 119  Ht 2' 0.53\" (62.3 cm)  Wt 16 lb 5 oz (7.4 kg)  BMI 19.06 kg/m2  Nica Lennon LPN    " done

## 2022-02-14 NOTE — DISCHARGE NOTE NURSING/CASE MANAGEMENT/SOCIAL WORK - NSDCPEFALRISK_GEN_ALL_CORE
For information on Fall & Injury Prevention, visit: [email or web address] OR  [email or web address] OR  https://www.cdc.gov/steadi/patient.html

## 2022-02-14 NOTE — DISCHARGE NOTE NURSING/CASE MANAGEMENT/SOCIAL WORK - PATIENT PORTAL LINK FT
You can access the FollowMyHealth Patient Portal offered by [facility] by registering at the following website: [email or web address]. By joining [facility]’s FollowMyHealth portal, you will also be able to view your health information using other applications (apps) compatible with our system.

## 2022-02-14 NOTE — ASU DISCHARGE PLAN (ADULT/PEDIATRIC) - CARE PROVIDER_API CALL
Chana Goldman)  Obstetrics and Gynecology  [address] [place] [address]  Phone: [phone]  Fax: [phone]  Follow Up Time: 2 weeks

## 2022-02-14 NOTE — BRIEF OPERATIVE NOTE - NSICDXBRIEFPROCEDURE_GEN_ALL_CORE_FT
PROCEDURES:  Sacrocolpopexy using robotic assistance 14-Feb-2022 11:41:41  Farrah Lieberman  Robot-assisted bilateral salpingectomy 14-Feb-2022 11:41:52  Farrah Lieberman  Creation, midurethral sling, female 14-Feb-2022 11:42:04  Farrah Lieberman  Cystoscopy, female 14-Feb-2022 11:42:14  Farrah Lieberman  Lysis of pelvic adhesions 14-Feb-2022 11:43:08  Farrah Lieberman

## 2022-02-14 NOTE — CHART NOTE - NSCHARTNOTEFT_GEN_A_CORE
Patient seen and examined at bedside with Dr. Edmond    ONEIL ARENAS is a 66y female who is s/p robotic assisted laparoscopic sacrocolpopexy, midurethral sling, cystoscopy and lysis of adhesions. Post- operative course is uncomplicated.     Patient states pain is well controlled with PRN medication, she stated that she had mild nausea, however requested nausea medication. She was able to tolerate PO intake and passed trial of void. 150 ml were instilled and patient voided 125 ml.     Vital Signs Last 24 Hrs  T(C): 36.7 (14 Feb 2022 14:33), Max: 36.7 (14 Feb 2022 14:33)  T(F): 98.1 (14 Feb 2022 14:33), Max: 98.1 (14 Feb 2022 14:33)  HR: 78 (14 Feb 2022 14:33) (69 - 78)  BP: 125/61 (14 Feb 2022 14:33) (110/64 - 133/75)  RR: 16 (14 Feb 2022 14:33) (12 - 16)  SpO2: 100% (14 Feb 2022 14:33) (96% - 100%)  General: well appearing; no distress  Cardiovascular: regular rate and rhythm, no murmurs, rubs or gallops appreciated  Respiratory: lungs clear to auscultation bilaterally  Abdominal: Appropriately tender to palpation; incisions appear dry and intact with dermabond  Extremities: no redness, tenderness, swelling or warmth on palpation      02-14-22 @ 07:01  -  02-14-22 @ 16:03  --------------------------------------------------------  IN: 1650 mL / OUT: 910 mL / NET: 740 mL        a/p  ONEIL ARENAS is a 66y female who is s/p robotic assisted laparoscopic sacrocolpopexy, midurethral sling, cystoscopy and lysis of adhesions. Post- operative course is uncomplicated. She is progressing well post- operatively.   - continue post- op management in AM  - PRN pain medication  - DVT prophylaxis--SCDs  - will consider discharge per attending approval    d/w Dr. Brower Patient seen and examined at bedside with Dr. Edmond    ONEIL ARENAS is a 66y female who is s/p robotic assisted laparoscopic sacrocolpopexy, midurethral sling, cystoscopy and lysis of adhesions. Post- operative course is uncomplicated.     Patient states pain is well controlled with PRN medication, she stated that she had mild nausea, however requested nausea medication. She was able to tolerate PO intake and passed trial of void. 150 ml were instilled and patient voided 125 ml.     Vital Signs Last 24 Hrs  T(C): 36.7 (14 Feb 2022 14:33), Max: 36.7 (14 Feb 2022 14:33)  T(F): 98.1 (14 Feb 2022 14:33), Max: 98.1 (14 Feb 2022 14:33)  HR: 78 (14 Feb 2022 14:33) (69 - 78)  BP: 125/61 (14 Feb 2022 14:33) (110/64 - 133/75)  RR: 16 (14 Feb 2022 14:33) (12 - 16)  SpO2: 100% (14 Feb 2022 14:33) (96% - 100%)  General: well appearing; no distress  Cardiovascular: regular rate and rhythm, no murmurs, rubs or gallops appreciated  Respiratory: lungs clear to auscultation bilaterally  Abdominal: Appropriately tender to palpation; incisions appear dry and intact with dermabond  Extremities: no redness, tenderness, swelling or warmth on palpation      02-14-22 @ 07:01  -  02-14-22 @ 16:03  --------------------------------------------------------  IN: 1650 mL / OUT: 910 mL / NET: 740 mL        a/p  ONEIL ARENAS is a 66y female who is s/p robotic assisted laparoscopic sacrocolpopexy, midurethral sling, cystoscopy and lysis of adhesions. Post- operative course is uncomplicated. She is progressing well post- operatively.   - continue post- op management in AM  - PRN pain medication  - DVT prophylaxis--SCDs  - will consider discharge per attending approval    d/w Dr. Brower          pt seen and examined, doing well, VSS, CBC appropriate, exam benign, plan for d/c home, after next void and bladder scan.  All questions were answered.

## 2022-02-14 NOTE — BRIEF OPERATIVE NOTE - NSICDXBRIEFPREOP_GEN_ALL_CORE_FT
PRE-OP DIAGNOSIS:  Vaginal vault prolapse, posthysterectomy 14-Feb-2022 11:41:01  Farrah Lieberman  Urinary, incontinence, stress female 14-Feb-2022 11:41:17  Farrah Lieberman

## 2022-02-14 NOTE — ASU PATIENT PROFILE, ADULT - NSICDXPASTMEDICALHX_GEN_ALL_CORE_FT
PAST MEDICAL HISTORY:  Chronic headaches     Dry eyes     GERD (gastroesophageal reflux disease)     History of cervical dysplasia     HLD (hyperlipidemia) no meds currently    Hot flashes     Mitral valve disorder moderate    Raynauds disease     Rheumatoid arthritis     Seasonal allergies     Stress incontinence     Temporomandibular joint disorder (TMJ)     Vaginal vault prolapse

## 2022-02-14 NOTE — BRIEF OPERATIVE NOTE - OPERATION/FINDINGS
Adhesions of bowel to the anterior abdominal wall in the pelvis, normal appearing tubes and ovaries bilaterally. Vaginal vault prolapse s/p hysterectomy, bilateral ureteral jets visualized on cystoscopy at the end of the case.

## 2022-02-14 NOTE — BRIEF OPERATIVE NOTE - NSICDXBRIEFPOSTOP_GEN_ALL_CORE_FT
POST-OP DIAGNOSIS:  Urinary, incontinence, stress female 14-Feb-2022 11:41:25  Farrah Lieberman  Vaginal vault prolapse, posthysterectomy 14-Feb-2022 11:41:22  Farrah Lieberman

## 2022-02-15 ENCOUNTER — NON-APPOINTMENT (OUTPATIENT)
Age: 67
End: 2022-02-15

## 2022-02-18 DIAGNOSIS — Z80.0 FAMILY HISTORY OF MALIGNANT NEOPLASM OF DIGESTIVE ORGANS: ICD-10-CM

## 2022-02-18 DIAGNOSIS — Z98.1 ARTHRODESIS STATUS: ICD-10-CM

## 2022-02-18 DIAGNOSIS — Z80.3 FAMILY HISTORY OF MALIGNANT NEOPLASM OF BREAST: ICD-10-CM

## 2022-02-18 DIAGNOSIS — N99.3 PROLAPSE OF VAGINAL VAULT AFTER HYSTERECTOMY: ICD-10-CM

## 2022-02-18 DIAGNOSIS — N39.3 STRESS INCONTINENCE (FEMALE) (MALE): ICD-10-CM

## 2022-02-18 DIAGNOSIS — K21.9 GASTRO-ESOPHAGEAL REFLUX DISEASE WITHOUT ESOPHAGITIS: ICD-10-CM

## 2022-02-18 DIAGNOSIS — M35.00 SJOGREN SYNDROME, UNSPECIFIED: ICD-10-CM

## 2022-02-18 DIAGNOSIS — M06.9 RHEUMATOID ARTHRITIS, UNSPECIFIED: ICD-10-CM

## 2022-02-18 DIAGNOSIS — N83.8 OTHER NONINFLAMMATORY DISORDERS OF OVARY, FALLOPIAN TUBE AND BROAD LIGAMENT: ICD-10-CM

## 2022-02-18 DIAGNOSIS — Z88.0 ALLERGY STATUS TO PENICILLIN: ICD-10-CM

## 2022-02-18 DIAGNOSIS — I73.00 RAYNAUD'S SYNDROME WITHOUT GANGRENE: ICD-10-CM

## 2022-02-18 DIAGNOSIS — E78.5 HYPERLIPIDEMIA, UNSPECIFIED: ICD-10-CM

## 2022-02-18 DIAGNOSIS — N73.6 FEMALE PELVIC PERITONEAL ADHESIONS (POSTINFECTIVE): ICD-10-CM

## 2022-02-18 DIAGNOSIS — Z88.2 ALLERGY STATUS TO SULFONAMIDES: ICD-10-CM

## 2022-02-18 DIAGNOSIS — I34.0 NONRHEUMATIC MITRAL (VALVE) INSUFFICIENCY: ICD-10-CM

## 2022-02-18 DIAGNOSIS — M34.1 CR(E)ST SYNDROME: ICD-10-CM

## 2022-02-18 DIAGNOSIS — Z87.891 PERSONAL HISTORY OF NICOTINE DEPENDENCE: ICD-10-CM

## 2022-02-18 DIAGNOSIS — M79.7 FIBROMYALGIA: ICD-10-CM

## 2022-03-01 ENCOUNTER — APPOINTMENT (OUTPATIENT)
Dept: UROGYNECOLOGY | Facility: CLINIC | Age: 67
End: 2022-03-01
Payer: COMMERCIAL

## 2022-03-01 DIAGNOSIS — R35.0 FREQUENCY OF MICTURITION: ICD-10-CM

## 2022-03-01 DIAGNOSIS — N99.3 PROLAPSE OF VAGINAL VAULT AFTER HYSTERECTOMY: ICD-10-CM

## 2022-03-01 DIAGNOSIS — R32 UNSPECIFIED URINARY INCONTINENCE: ICD-10-CM

## 2022-03-01 PROCEDURE — 99024 POSTOP FOLLOW-UP VISIT: CPT | Mod: GC

## 2022-03-01 NOTE — PHYSICAL EXAM
[Chaperone Present] : A chaperone was present in the examining room during all aspects of the physical examination [No Acute Distress] : in no acute distress [Well developed] : well developed [Well Nourished] : ~L well nourished [Oriented x3] : oriented to person, place, and time [Normal Memory] : ~T memory was ~L unimpaired [Normal Mood/Affect] : mood and affect are normal [Cough] : no cough [Tenderness] : ~T no ~M abdominal tenderness observed [Distended] : not distended [Hernia] : no hernia observed [Scar] : a scar was noted [Inguinal LAD] : no adenopathy was noted in the inguinal lymph nodes [Warm and Dry] : was warm and dry to touch [Labia Majora] : were normal [Labia Minora] : were normal [Normal Appearance] : general appearance was normal [No Bleeding] : there was no active vaginal bleeding [Normal] : was normal [FreeTextEntry4] : digital finger with intact suture line, no palpable mesh, excellent support

## 2022-03-01 NOTE — HISTORY OF PRESENT ILLNESS
[FreeTextEntry1] : \par Berna is 2 wks s/p robotic SCP/sling/cysto\par doing well, sometimes feels tight, no leakage, slow stream,denies frequency/urgency, occ constipation, minimal pain, restarted estrogen patch

## 2022-03-01 NOTE — DISCUSSION/SUMMARY
[FreeTextEntry1] : \par Berna presents 2 wks postop, doing well, no complaints, benign path\par f/u in 4 wks, reviewed precautions\par all questions were answered

## 2022-03-29 ENCOUNTER — RESULT CHARGE (OUTPATIENT)
Age: 67
End: 2022-03-29

## 2022-03-29 ENCOUNTER — APPOINTMENT (OUTPATIENT)
Dept: UROGYNECOLOGY | Facility: CLINIC | Age: 67
End: 2022-03-29
Payer: COMMERCIAL

## 2022-03-29 VITALS — DIASTOLIC BLOOD PRESSURE: 68 MMHG | SYSTOLIC BLOOD PRESSURE: 131 MMHG

## 2022-03-29 DIAGNOSIS — Z98.890 OTHER SPECIFIED POSTPROCEDURAL STATES: ICD-10-CM

## 2022-03-29 PROBLEM — R51.9 HEADACHE, UNSPECIFIED: Chronic | Status: ACTIVE | Noted: 2022-02-04

## 2022-03-29 PROBLEM — I05.9 RHEUMATIC MITRAL VALVE DISEASE, UNSPECIFIED: Chronic | Status: ACTIVE | Noted: 2022-02-04

## 2022-03-29 PROBLEM — E78.5 HYPERLIPIDEMIA, UNSPECIFIED: Chronic | Status: ACTIVE | Noted: 2022-02-04

## 2022-03-29 PROBLEM — N39.3 STRESS INCONTINENCE (FEMALE) (MALE): Chronic | Status: ACTIVE | Noted: 2022-02-04

## 2022-03-29 PROBLEM — J30.2 OTHER SEASONAL ALLERGIC RHINITIS: Chronic | Status: ACTIVE | Noted: 2022-02-04

## 2022-03-29 PROBLEM — M06.9 RHEUMATOID ARTHRITIS, UNSPECIFIED: Chronic | Status: ACTIVE | Noted: 2022-02-04

## 2022-03-29 PROBLEM — K21.9 GASTRO-ESOPHAGEAL REFLUX DISEASE WITHOUT ESOPHAGITIS: Chronic | Status: ACTIVE | Noted: 2022-02-04

## 2022-03-29 PROBLEM — H04.123 DRY EYE SYNDROME OF BILATERAL LACRIMAL GLANDS: Chronic | Status: ACTIVE | Noted: 2022-02-04

## 2022-03-29 PROBLEM — N81.9 FEMALE GENITAL PROLAPSE, UNSPECIFIED: Chronic | Status: ACTIVE | Noted: 2022-02-04

## 2022-03-29 PROBLEM — R23.2 FLUSHING: Chronic | Status: ACTIVE | Noted: 2022-02-04

## 2022-03-29 PROBLEM — M26.609 UNSPECIFIED TEMPOROMANDIBULAR JOINT DISORDER, UNSPECIFIED SIDE: Chronic | Status: ACTIVE | Noted: 2022-02-04

## 2022-03-29 PROBLEM — I73.00 RAYNAUD'S SYNDROME WITHOUT GANGRENE: Chronic | Status: ACTIVE | Noted: 2022-02-04

## 2022-03-29 PROBLEM — Z87.410 PERSONAL HISTORY OF CERVICAL DYSPLASIA: Chronic | Status: ACTIVE | Noted: 2022-02-04

## 2022-03-29 LAB
BILIRUB UR QL STRIP: NEGATIVE
CLARITY UR: CLEAR
COLLECTION METHOD: NORMAL
GLUCOSE UR-MCNC: NEGATIVE
HCG UR QL: 0.2 EU/DL
HGB UR QL STRIP.AUTO: NORMAL
KETONES UR-MCNC: NEGATIVE
LEUKOCYTE ESTERASE UR QL STRIP: NORMAL
NITRITE UR QL STRIP: NEGATIVE
PH UR STRIP: 6
PROT UR STRIP-MCNC: NEGATIVE
SP GR UR STRIP: 1

## 2022-03-29 PROCEDURE — 99024 POSTOP FOLLOW-UP VISIT: CPT

## 2022-03-29 NOTE — HISTORY OF PRESENT ILLNESS
[FreeTextEntry1] : \par Berna is 6 wks s/p robotic SCP/sling/cysto\par doing well, no leakage, no frequency/urgency, no incomplete emptying, occ constipation vs diarrhea, overall feels happy, no bulge

## 2022-03-29 NOTE — DISCUSSION/SUMMARY
[FreeTextEntry1] : \par Berna presents 6 wks postop, doing well, no complaints, return to normal activity\par bowel regimen PRN\par return in 6 months, all questions were answered

## 2022-03-29 NOTE — PHYSICAL EXAM
[Chaperone Present] : A chaperone was present in the examining room during all aspects of the physical examination [No Acute Distress] : in no acute distress [Well developed] : well developed [Well Nourished] : ~L well nourished [Oriented x3] : oriented to person, place, and time [Normal Memory] : ~T memory was ~L unimpaired [Normal Mood/Affect] : mood and affect are normal [Cough] : no cough [Tenderness] : ~T no ~M abdominal tenderness observed [Distended] : not distended [Hernia] : no hernia observed [Scar] : a scar was noted [Inguinal LAD] : no adenopathy was noted in the inguinal lymph nodes [Warm and Dry] : was warm and dry to touch [Labia Majora] : were normal [Labia Minora] : were normal [Normal Appearance] : general appearance was normal [No Bleeding] : there was no active vaginal bleeding [Normal] : was normal [FreeTextEntry4] :  no palpable mesh, excellent support

## 2022-04-04 LAB
APPEARANCE: CLEAR
BACTERIA UR CULT: NORMAL
BACTERIA: NEGATIVE
BILIRUBIN URINE: NEGATIVE
BLOOD URINE: NEGATIVE
COLOR: COLORLESS
GLUCOSE QUALITATIVE U: NEGATIVE
HYALINE CASTS: 0 /LPF
KETONES URINE: NEGATIVE
LEUKOCYTE ESTERASE URINE: NEGATIVE
MICROSCOPIC-UA: NORMAL
NITRITE URINE: NEGATIVE
PH URINE: 6.5
PROTEIN URINE: NEGATIVE
RED BLOOD CELLS URINE: 5 /HPF
SPECIFIC GRAVITY URINE: 1
SQUAMOUS EPITHELIAL CELLS: 1 /HPF
UROBILINOGEN URINE: NORMAL
WHITE BLOOD CELLS URINE: 0 /HPF

## 2022-07-25 ENCOUNTER — APPOINTMENT (OUTPATIENT)
Age: 67
End: 2022-07-25

## 2022-09-08 ENCOUNTER — APPOINTMENT (OUTPATIENT)
Age: 67
End: 2022-09-08

## 2022-09-08 ENCOUNTER — RESULT CHARGE (OUTPATIENT)
Age: 67
End: 2022-09-08

## 2022-09-08 VITALS — DIASTOLIC BLOOD PRESSURE: 65 MMHG | SYSTOLIC BLOOD PRESSURE: 127 MMHG | HEART RATE: 81 BPM

## 2022-09-08 DIAGNOSIS — R15.9 FULL INCONTINENCE OF FECES: ICD-10-CM

## 2022-09-08 PROCEDURE — 51798 US URINE CAPACITY MEASURE: CPT

## 2022-09-08 PROCEDURE — 81003 URINALYSIS AUTO W/O SCOPE: CPT | Mod: QW

## 2022-09-08 PROCEDURE — 99213 OFFICE O/P EST LOW 20 MIN: CPT

## 2022-09-08 NOTE — HISTORY OF PRESENT ILLNESS
[FreeTextEntry1] : \par Berna is 6 months s/p robotic SCP/sling/cysto\marielle doing well, no leakage, no frequency/urgency, no incomplete emptying, alternating constipation when she takes immodium due to diarrhea and leakage of stool if she does not take , no leakage of stool, overall feels happy, no bulge, she has tried PT, saw GI few months

## 2022-09-08 NOTE — DISCUSSION/SUMMARY
[FreeTextEntry1] : \par Berna presents 6 months postop, doing well, no complaints, return to normal activity\par bowel regimen PRN, has seen GI, f/u PRN\par All questions were answered

## 2022-09-08 NOTE — PHYSICAL EXAM
[Chaperone Present] : A chaperone was present in the examining room during all aspects of the physical examination [No Acute Distress] : in no acute distress [Well developed] : well developed [Well Nourished] : ~L well nourished [Oriented x3] : oriented to person, place, and time [Normal Memory] : ~T memory was ~L unimpaired [Normal Mood/Affect] : mood and affect are normal [Scar] : a scar was noted [Warm and Dry] : was warm and dry to touch [Labia Majora] : were normal [Labia Minora] : were normal [Normal Appearance] : general appearance was normal [No Bleeding] : there was no active vaginal bleeding [Normal] : was normal [Cough] : no cough [Tenderness] : ~T no ~M abdominal tenderness observed [Distended] : not distended [Hernia] : no hernia observed [Inguinal LAD] : no adenopathy was noted in the inguinal lymph nodes [FreeTextEntry4] :  no palpable mesh, excellent support

## 2024-04-29 ENCOUNTER — NON-APPOINTMENT (OUTPATIENT)
Age: 69
End: 2024-04-29

## 2024-04-29 ENCOUNTER — APPOINTMENT (OUTPATIENT)
Dept: OBGYN | Facility: CLINIC | Age: 69
End: 2024-04-29
Payer: COMMERCIAL

## 2024-04-29 VITALS
HEIGHT: 62 IN | WEIGHT: 123 LBS | SYSTOLIC BLOOD PRESSURE: 110 MMHG | BODY MASS INDEX: 22.63 KG/M2 | DIASTOLIC BLOOD PRESSURE: 68 MMHG

## 2024-04-29 DIAGNOSIS — R23.2 FLUSHING: ICD-10-CM

## 2024-04-29 DIAGNOSIS — Z79.890 HORMONE REPLACEMENT THERAPY: ICD-10-CM

## 2024-04-29 DIAGNOSIS — Z12.39 ENCOUNTER FOR OTHER SCREENING FOR MALIGNANT NEOPLASM OF BREAST: ICD-10-CM

## 2024-04-29 PROCEDURE — 99203 OFFICE O/P NEW LOW 30 MIN: CPT

## 2024-04-29 RX ORDER — DEXLANSOPRAZOLE 60 MG/1
60 CAPSULE, DELAYED RELEASE ORAL
Refills: 0 | Status: ACTIVE | COMMUNITY

## 2024-04-29 RX ORDER — UBIDECARENONE 200 MG
200 CAPSULE ORAL
Refills: 0 | Status: ACTIVE | COMMUNITY

## 2024-04-29 RX ORDER — CHROMIUM 200 MCG
TABLET ORAL
Refills: 0 | Status: ACTIVE | COMMUNITY

## 2024-04-29 NOTE — HISTORY OF PRESENT ILLNESS
[FreeTextEntry1] : HPI   67 y/o presents for NEW patient visit Prior gyn office closed suddenly   on HRT for hot flushes and needs a refill estradiol 0.05 patch    hx of vaginal vault prolapse after hysterectomy managed by urogyn   LMP: n/a   Last pap: 2023 at [facility] office  Last MM2023 birad 1 (noman)     PMH: IBS, JIL, CREST syndrome, fibromyalgia, Raynaud's, RA PSH: abdominoplasty, hysterectomy   (TVH), spinal surgery, bladder sling   ALL: PCN, Sulfa  Sochx: FORMER Tobacco use, social etoh; denies illicits   OBHx:  x 3 FAMHX:  mum breast cancer age 78; denies ovarian, colon, uterine cancer     --------------------------------------------------------------------------------------------------------- ASSESSMENT & PLAN:    67 y/o   #NEW Patient on HRT  #hysterectomy: -refill climara 0.05 weekly patch; r/b/a reviewed -MMG/US script    rto 2024 gyn annual or prn    Dr. Carlee Coffman, DO, MPH, FACOG

## 2024-05-28 ENCOUNTER — NON-APPOINTMENT (OUTPATIENT)
Age: 69
End: 2024-05-28

## 2024-06-20 RX ORDER — ESTRADIOL 0.07 MG/D
0.07 PATCH TRANSDERMAL
Qty: 12 | Refills: 3 | Status: ACTIVE | COMMUNITY
Start: 2024-04-29

## 2024-09-09 ENCOUNTER — APPOINTMENT (OUTPATIENT)
Dept: OBGYN | Facility: CLINIC | Age: 69
End: 2024-09-09
Payer: COMMERCIAL

## 2024-09-09 VITALS
WEIGHT: 120 LBS | HEIGHT: 62 IN | SYSTOLIC BLOOD PRESSURE: 114 MMHG | DIASTOLIC BLOOD PRESSURE: 72 MMHG | BODY MASS INDEX: 22.08 KG/M2

## 2024-09-09 DIAGNOSIS — Z11.3 ENCOUNTER FOR SCREENING FOR INFECTIONS WITH A PREDOMINANTLY SEXUAL MODE OF TRANSMISSION: ICD-10-CM

## 2024-09-09 DIAGNOSIS — N76.0 ACUTE VAGINITIS: ICD-10-CM

## 2024-09-09 DIAGNOSIS — R23.2 FLUSHING: ICD-10-CM

## 2024-09-09 DIAGNOSIS — N30.10 INTERSTITIAL CYSTITIS (CHRONIC) W/OUT HEMATURIA: ICD-10-CM

## 2024-09-09 DIAGNOSIS — R30.0 DYSURIA: ICD-10-CM

## 2024-09-09 DIAGNOSIS — R10.2 PELVIC AND PERINEAL PAIN: ICD-10-CM

## 2024-09-09 LAB
APPEARANCE: CLEAR
BILIRUBIN URINE: NEGATIVE
BLOOD URINE: ABNORMAL
COLOR: YELLOW
GLUCOSE QUALITATIVE U: NEGATIVE
KETONES URINE: NEGATIVE
LEUKOCYTE ESTERASE URINE: NEGATIVE
NITRITE URINE: NEGATIVE
PH URINE: 5.5
PROTEIN URINE: NEGATIVE
SPECIFIC GRAVITY URINE: <=1.005
UROBILINOGEN URINE: 0.2 (ref 0.2–?)

## 2024-09-09 PROCEDURE — 99214 OFFICE O/P EST MOD 30 MIN: CPT

## 2024-09-09 PROCEDURE — 99459 PELVIC EXAMINATION: CPT

## 2024-09-09 RX ORDER — PHENAZOPYRIDINE 100 MG/1
100 TABLET, FILM COATED ORAL
Qty: 9 | Refills: 2 | Status: ACTIVE | COMMUNITY
Start: 2024-09-09 | End: 1900-01-01

## 2024-09-09 RX ORDER — ABATACEPT 125 MG/ML
125 INJECTION, SOLUTION SUBCUTANEOUS
Refills: 0 | Status: ACTIVE | COMMUNITY

## 2024-09-12 LAB — BACTERIA UR CULT: NORMAL

## 2024-09-13 ENCOUNTER — NON-APPOINTMENT (OUTPATIENT)
Age: 69
End: 2024-09-13

## 2024-09-13 NOTE — HISTORY OF PRESENT ILLNESS
[FreeTextEntry1] : HPI   c/o 1 wk hot flashes and frequent voiding. On estradiol 0.075 weekly  has a hysterectomy   reports dysuria received Macrobid from urgent care; didn't finish it b/c culture negative ucx all neg did covid and flu both neg from urgent care also went to pcp who ucx was neg  c/o of pelvic pressure   c/o of some vaginal discharge c/o vaginal itching  LMP: n/a   Last pap: 2023 neg per patient  Last MM2023 birad 1        --------------------------------------------------------------------------------------------------------- ASSESSMENT & PLAN:    69 y/o  #dysuria #pelvic pain #hysterectomy #hx of bladder sling #suspect IC #r/o vaginitis  -ucx sent -vaginitis culture and gc/c -return to Dr. Cartwright; referral ordered -pyridium script; education provided   #hot flashes -on 0.075 estradiol -discussed switching back to prior brand -discussed cardiovascular risk or increased estrogen; r/b/a   rto 2-6 wk TV US f/u pelvic pain    Dr. Carlee Coffman DO, MPH, FACOG

## 2024-09-13 NOTE — PHYSICAL EXAM
[Chaperone Present] : A chaperone was present in the examining room during all aspects of the physical examination [91589] : A chaperone was present during the pelvic exam. [FreeTextEntry2] : JEVON [Appropriately responsive] : appropriately responsive [Alert] : alert [No Acute Distress] : no acute distress [Soft] : soft [Non-tender] : non-tender [Non-distended] : non-distended [Oriented x3] : oriented x3 [Labia Majora] : normal [Labia Minora] : normal [Normal] : normal [No Bleeding] : There was no active vaginal bleeding [Absent] : absent [Uterine Adnexae] : non-palpable

## 2024-09-13 NOTE — PHYSICAL EXAM
[Chaperone Present] : A chaperone was present in the examining room during all aspects of the physical examination [[id number]] : A chaperone was present during the pelvic exam. [FreeTextEntry2] : JEVON [Appropriately responsive] : appropriately responsive [Alert] : alert [No Acute Distress] : no acute distress [Soft] : soft [Non-tender] : non-tender [Non-distended] : non-distended [Oriented x3] : oriented x3 [Labia Majora] : normal [Labia Minora] : normal [Normal] : normal [No Bleeding] : There was no active vaginal bleeding [Absent] : absent [Uterine Adnexae] : non-palpable

## 2024-10-08 ENCOUNTER — APPOINTMENT (OUTPATIENT)
Dept: OBGYN | Facility: CLINIC | Age: 69
End: 2024-10-08
Payer: COMMERCIAL

## 2024-10-08 ENCOUNTER — APPOINTMENT (OUTPATIENT)
Dept: ANTEPARTUM | Facility: CLINIC | Age: 69
End: 2024-10-08
Payer: COMMERCIAL

## 2024-10-08 ENCOUNTER — ASOB RESULT (OUTPATIENT)
Age: 69
End: 2024-10-08

## 2024-10-08 VITALS
DIASTOLIC BLOOD PRESSURE: 70 MMHG | SYSTOLIC BLOOD PRESSURE: 120 MMHG | HEIGHT: 62 IN | WEIGHT: 123 LBS | BODY MASS INDEX: 22.63 KG/M2

## 2024-10-08 DIAGNOSIS — N83.209 UNSPECIFIED OVARIAN CYST, UNSPECIFIED SIDE: ICD-10-CM

## 2024-10-08 DIAGNOSIS — R10.2 PELVIC AND PERINEAL PAIN: ICD-10-CM

## 2024-10-08 PROCEDURE — 76830 TRANSVAGINAL US NON-OB: CPT

## 2024-10-08 PROCEDURE — 99214 OFFICE O/P EST MOD 30 MIN: CPT

## 2024-10-08 PROCEDURE — 76857 US EXAM PELVIC LIMITED: CPT | Mod: 59

## 2024-10-14 PROBLEM — N83.209 SIMPLE OVARIAN CYST: Status: ACTIVE | Noted: 2024-10-14

## 2024-11-11 ENCOUNTER — APPOINTMENT (OUTPATIENT)
Dept: UROGYNECOLOGY | Facility: CLINIC | Age: 69
End: 2024-11-11

## 2024-12-16 ENCOUNTER — APPOINTMENT (OUTPATIENT)
Dept: ORTHOPEDIC SURGERY | Facility: CLINIC | Age: 69
End: 2024-12-16

## 2024-12-16 DIAGNOSIS — M75.121 COMPLETE ROTATOR CUFF TEAR OR RUPTURE OF RIGHT SHOULDER, NOT SPECIFIED AS TRAUMATIC: ICD-10-CM

## 2024-12-16 DIAGNOSIS — M75.41 IMPINGEMENT SYNDROME OF RIGHT SHOULDER: ICD-10-CM

## 2024-12-16 DIAGNOSIS — M75.51 BURSITIS OF RIGHT SHOULDER: ICD-10-CM

## 2024-12-16 PROCEDURE — 99202 OFFICE O/P NEW SF 15 MIN: CPT | Mod: 25

## 2024-12-16 PROCEDURE — 20611 DRAIN/INJ JOINT/BURSA W/US: CPT | Mod: RT

## 2024-12-30 ENCOUNTER — RESULT REVIEW (OUTPATIENT)
Age: 69
End: 2024-12-30

## 2025-01-06 ENCOUNTER — APPOINTMENT (OUTPATIENT)
Dept: ORTHOPEDIC SURGERY | Facility: CLINIC | Age: 70
End: 2025-01-06

## 2025-01-06 DIAGNOSIS — M75.51 BURSITIS OF RIGHT SHOULDER: ICD-10-CM

## 2025-01-06 DIAGNOSIS — M75.121 COMPLETE ROTATOR CUFF TEAR OR RUPTURE OF RIGHT SHOULDER, NOT SPECIFIED AS TRAUMATIC: ICD-10-CM

## 2025-01-06 DIAGNOSIS — M75.41 IMPINGEMENT SYNDROME OF RIGHT SHOULDER: ICD-10-CM

## 2025-01-06 PROCEDURE — 20611 DRAIN/INJ JOINT/BURSA W/US: CPT | Mod: RT

## 2025-01-06 PROCEDURE — 99214 OFFICE O/P EST MOD 30 MIN: CPT | Mod: 25

## 2025-01-14 ENCOUNTER — APPOINTMENT (OUTPATIENT)
Dept: OBGYN | Facility: CLINIC | Age: 70
End: 2025-01-14

## 2025-01-14 ENCOUNTER — APPOINTMENT (OUTPATIENT)
Dept: ANTEPARTUM | Facility: CLINIC | Age: 70
End: 2025-01-14

## 2025-01-17 ENCOUNTER — NON-APPOINTMENT (OUTPATIENT)
Age: 70
End: 2025-01-17

## 2025-04-14 ENCOUNTER — APPOINTMENT (OUTPATIENT)
Dept: ORTHOPEDIC SURGERY | Facility: CLINIC | Age: 70
End: 2025-04-14

## 2025-04-14 DIAGNOSIS — M75.121 COMPLETE ROTATOR CUFF TEAR OR RUPTURE OF RIGHT SHOULDER, NOT SPECIFIED AS TRAUMATIC: ICD-10-CM

## 2025-04-14 DIAGNOSIS — M75.41 IMPINGEMENT SYNDROME OF RIGHT SHOULDER: ICD-10-CM

## 2025-04-14 DIAGNOSIS — M75.51 BURSITIS OF RIGHT SHOULDER: ICD-10-CM

## 2025-04-14 PROCEDURE — L3960: CPT | Mod: RT

## 2025-04-14 PROCEDURE — 99214 OFFICE O/P EST MOD 30 MIN: CPT

## 2025-04-14 RX ORDER — OXYCODONE 5 MG/1
5 TABLET ORAL
Qty: 30 | Refills: 0 | Status: ACTIVE | COMMUNITY
Start: 2025-04-14 | End: 1900-01-01

## 2025-05-01 ENCOUNTER — APPOINTMENT (OUTPATIENT)
Dept: ORTHOPEDIC SURGERY | Facility: HOSPITAL | Age: 70
End: 2025-05-01
Payer: COMMERCIAL

## 2025-05-01 PROCEDURE — 29823 SHO ARTHRS SRG XTNSV DBRDMT: CPT | Mod: AS,59,RT

## 2025-05-01 PROCEDURE — 29826 SHO ARTHRS SRG DECOMPRESSION: CPT | Mod: RT

## 2025-05-01 PROCEDURE — 29821 SHO ARTHRS SRG COMPL SYNVCT: CPT | Mod: AS,59,RT

## 2025-05-01 PROCEDURE — 29827 SHO ARTHRS SRG RT8TR CUF RPR: CPT | Mod: RT

## 2025-05-01 PROCEDURE — 29821 SHO ARTHRS SRG COMPL SYNVCT: CPT | Mod: 59,RT

## 2025-05-01 PROCEDURE — 29823 SHO ARTHRS SRG XTNSV DBRDMT: CPT | Mod: 59,RT

## 2025-05-01 PROCEDURE — 29827 SHO ARTHRS SRG RT8TR CUF RPR: CPT | Mod: AS,RT

## 2025-05-01 PROCEDURE — 23430 REPAIR BICEPS TENDON: CPT | Mod: 59,RT

## 2025-05-01 PROCEDURE — 23430 REPAIR BICEPS TENDON: CPT | Mod: AS,59,RT

## 2025-05-01 PROCEDURE — 29826 SHO ARTHRS SRG DECOMPRESSION: CPT | Mod: AS,RT

## 2025-05-05 ENCOUNTER — APPOINTMENT (OUTPATIENT)
Dept: ANTEPARTUM | Facility: CLINIC | Age: 70
End: 2025-05-05

## 2025-05-09 ENCOUNTER — APPOINTMENT (OUTPATIENT)
Dept: ORTHOPEDIC SURGERY | Facility: CLINIC | Age: 70
End: 2025-05-09
Payer: COMMERCIAL

## 2025-05-09 DIAGNOSIS — M75.41 IMPINGEMENT SYNDROME OF RIGHT SHOULDER: ICD-10-CM

## 2025-05-09 DIAGNOSIS — M75.51 BURSITIS OF RIGHT SHOULDER: ICD-10-CM

## 2025-05-09 DIAGNOSIS — M75.121 COMPLETE ROTATOR CUFF TEAR OR RUPTURE OF RIGHT SHOULDER, NOT SPECIFIED AS TRAUMATIC: ICD-10-CM

## 2025-05-09 PROCEDURE — 99024 POSTOP FOLLOW-UP VISIT: CPT

## 2025-05-09 RX ORDER — IBUPROFEN 800 MG/1
800 TABLET, FILM COATED ORAL 3 TIMES DAILY
Qty: 90 | Refills: 0 | Status: ACTIVE | COMMUNITY
Start: 2025-05-09 | End: 1900-01-01

## 2025-05-29 ENCOUNTER — APPOINTMENT (OUTPATIENT)
Dept: OBGYN | Facility: CLINIC | Age: 70
End: 2025-05-29

## 2025-06-06 ENCOUNTER — APPOINTMENT (OUTPATIENT)
Dept: OBGYN | Facility: CLINIC | Age: 70
End: 2025-06-06

## 2025-06-06 ENCOUNTER — APPOINTMENT (OUTPATIENT)
Dept: ORTHOPEDIC SURGERY | Facility: CLINIC | Age: 70
End: 2025-06-06
Payer: COMMERCIAL

## 2025-06-06 PROCEDURE — 99024 POSTOP FOLLOW-UP VISIT: CPT

## 2025-06-24 ENCOUNTER — NON-APPOINTMENT (OUTPATIENT)
Age: 70
End: 2025-06-24

## 2025-06-24 ENCOUNTER — APPOINTMENT (OUTPATIENT)
Dept: OBGYN | Facility: CLINIC | Age: 70
End: 2025-06-24
Payer: COMMERCIAL

## 2025-06-24 VITALS
WEIGHT: 120 LBS | SYSTOLIC BLOOD PRESSURE: 110 MMHG | DIASTOLIC BLOOD PRESSURE: 68 MMHG | BODY MASS INDEX: 22.08 KG/M2 | HEIGHT: 62 IN

## 2025-06-24 PROBLEM — N95.1 MENOPAUSE SYNDROME: Status: ACTIVE | Noted: 2025-06-24

## 2025-06-24 PROBLEM — Z01.419 ENCOUNTER FOR ANNUAL ROUTINE GYNECOLOGICAL EXAMINATION: Status: ACTIVE | Noted: 2025-06-24

## 2025-06-24 PROBLEM — Z82.49 FAMILY HISTORY OF HYPERTENSION: Status: ACTIVE | Noted: 2025-06-24

## 2025-06-24 PROBLEM — Z78.9 CONSUMES ALCOHOL OCCASIONALLY: Status: ACTIVE | Noted: 2025-06-24

## 2025-06-24 PROBLEM — Z86.19 HISTORY OF INFECTION DUE TO HUMAN PAPILLOMA VIRUS (HPV): Status: RESOLVED | Noted: 2025-06-24 | Resolved: 2025-06-24

## 2025-06-24 LAB
CARD LOT #: NORMAL
CARD LOT EXP DATE: NORMAL
DATE COLLECTED: NORMAL
DATE COLLECTED: NORMAL
DEVELOPER LOT #: NORMAL
DEVELOPER LOT EXP DATE: NORMAL
HEMOCCULT 2: NEGATIVE
HEMOCCULT SP1 STL QL: NEGATIVE
QUALITY CONTROL: YES
QUALITY CONTROL: YES

## 2025-06-24 PROCEDURE — 99387 INIT PM E/M NEW PAT 65+ YRS: CPT

## 2025-06-24 PROCEDURE — 82270 OCCULT BLOOD FECES: CPT

## 2025-06-24 RX ORDER — ETANERCEPT 50 MG/ML
50 SOLUTION SUBCUTANEOUS
Refills: 0 | Status: ACTIVE | COMMUNITY

## 2025-06-25 LAB — HPV HIGH+LOW RISK DNA PNL CVX: NOT DETECTED

## 2025-06-30 LAB — CYTOLOGY CVX/VAG DOC THIN PREP: NORMAL

## 2025-07-05 NOTE — H&P PST ADULT - NSWEIGHTCALCTOOLDRUG_GEN_A_CORE
Problem: At Risk for Falls  Goal: Patient takes action to control fall-related risks  Outcome: Monitoring/Evaluating progress     Problem: Pain  Goal: Acceptable pain level achieved/maintained at rest using appropriate pain scale for the patient  Outcome: Monitoring/Evaluating progress  Goal: Acceptable pain level achieved/maintained with activity using appropriate pain scale for the patient  Outcome: Monitoring/Evaluating progress       used

## 2025-07-14 ENCOUNTER — APPOINTMENT (OUTPATIENT)
Dept: ORTHOPEDIC SURGERY | Facility: CLINIC | Age: 70
End: 2025-07-14
Payer: COMMERCIAL

## 2025-07-14 PROCEDURE — 99024 POSTOP FOLLOW-UP VISIT: CPT

## 2025-07-14 RX ORDER — METHYLPREDNISOLONE 4 MG/1
4 TABLET ORAL
Qty: 1 | Refills: 1 | Status: ACTIVE | COMMUNITY
Start: 2025-07-14 | End: 1900-01-01

## 2025-08-04 ENCOUNTER — APPOINTMENT (OUTPATIENT)
Dept: ORTHOPEDIC SURGERY | Facility: CLINIC | Age: 70
End: 2025-08-04
Payer: COMMERCIAL

## 2025-08-04 DIAGNOSIS — M75.01 ADHESIVE CAPSULITIS OF RIGHT SHOULDER: ICD-10-CM

## 2025-08-04 DIAGNOSIS — M75.41 IMPINGEMENT SYNDROME OF RIGHT SHOULDER: ICD-10-CM

## 2025-08-04 DIAGNOSIS — M75.121 COMPLETE ROTATOR CUFF TEAR OR RUPTURE OF RIGHT SHOULDER, NOT SPECIFIED AS TRAUMATIC: ICD-10-CM

## 2025-08-04 DIAGNOSIS — Z98.890 OTHER SPECIFIED POSTPROCEDURAL STATES: ICD-10-CM

## 2025-08-04 PROCEDURE — 99213 OFFICE O/P EST LOW 20 MIN: CPT

## 2025-08-07 ENCOUNTER — RESULT REVIEW (OUTPATIENT)
Age: 70
End: 2025-08-07